# Patient Record
Sex: FEMALE | Race: WHITE | NOT HISPANIC OR LATINO | URBAN - METROPOLITAN AREA
[De-identification: names, ages, dates, MRNs, and addresses within clinical notes are randomized per-mention and may not be internally consistent; named-entity substitution may affect disease eponyms.]

---

## 2019-10-10 VITALS
HEIGHT: 60 IN | SYSTOLIC BLOOD PRESSURE: 109 MMHG | WEIGHT: 110.89 LBS | RESPIRATION RATE: 16 BRPM | DIASTOLIC BLOOD PRESSURE: 71 MMHG | OXYGEN SATURATION: 97 % | TEMPERATURE: 98 F | HEART RATE: 82 BPM

## 2019-10-11 ENCOUNTER — INPATIENT (INPATIENT)
Facility: HOSPITAL | Age: 69
LOS: 0 days | Discharge: HOME | DRG: 748 | End: 2019-10-12
Attending: UROLOGY | Admitting: UROLOGY
Payer: COMMERCIAL

## 2019-10-11 DIAGNOSIS — Z90.49 ACQUIRED ABSENCE OF OTHER SPECIFIED PARTS OF DIGESTIVE TRACT: Chronic | ICD-10-CM

## 2019-10-11 DIAGNOSIS — Z98.890 OTHER SPECIFIED POSTPROCEDURAL STATES: Chronic | ICD-10-CM

## 2019-10-11 RX ORDER — SODIUM CHLORIDE 9 MG/ML
1000 INJECTION, SOLUTION INTRAVENOUS
Refills: 0 | Status: DISCONTINUED | OUTPATIENT
Start: 2019-10-11 | End: 2019-10-12

## 2019-10-11 RX ORDER — CEPHALEXIN 500 MG
1 CAPSULE ORAL
Qty: 6 | Refills: 0
Start: 2019-10-11 | End: 2019-10-13

## 2019-10-11 RX ORDER — METOCLOPRAMIDE HCL 10 MG
10 TABLET ORAL ONCE
Refills: 0 | Status: COMPLETED | OUTPATIENT
Start: 2019-10-11 | End: 2019-10-11

## 2019-10-11 RX ORDER — ONDANSETRON 8 MG/1
4 TABLET, FILM COATED ORAL EVERY 6 HOURS
Refills: 0 | Status: DISCONTINUED | OUTPATIENT
Start: 2019-10-11 | End: 2019-10-12

## 2019-10-11 RX ORDER — OXYCODONE AND ACETAMINOPHEN 5; 325 MG/1; MG/1
1 TABLET ORAL EVERY 4 HOURS
Refills: 0 | Status: DISCONTINUED | OUTPATIENT
Start: 2019-10-11 | End: 2019-10-12

## 2019-10-11 RX ORDER — OXYCODONE AND ACETAMINOPHEN 5; 325 MG/1; MG/1
2 TABLET ORAL EVERY 6 HOURS
Refills: 0 | Status: DISCONTINUED | OUTPATIENT
Start: 2019-10-11 | End: 2019-10-12

## 2019-10-11 RX ORDER — HYDROMORPHONE HYDROCHLORIDE 2 MG/ML
0.5 INJECTION INTRAMUSCULAR; INTRAVENOUS; SUBCUTANEOUS
Refills: 0 | Status: DISCONTINUED | OUTPATIENT
Start: 2019-10-11 | End: 2019-10-12

## 2019-10-11 RX ADMIN — ONDANSETRON 4 MILLIGRAM(S): 8 TABLET, FILM COATED ORAL at 22:15

## 2019-10-11 RX ADMIN — HYDROMORPHONE HYDROCHLORIDE 0.5 MILLIGRAM(S): 2 INJECTION INTRAMUSCULAR; INTRAVENOUS; SUBCUTANEOUS at 18:50

## 2019-10-11 RX ADMIN — HYDROMORPHONE HYDROCHLORIDE 0.5 MILLIGRAM(S): 2 INJECTION INTRAMUSCULAR; INTRAVENOUS; SUBCUTANEOUS at 19:14

## 2019-10-11 RX ADMIN — Medication 10 MILLIGRAM(S): at 19:45

## 2019-10-11 RX ADMIN — HYDROMORPHONE HYDROCHLORIDE 0.5 MILLIGRAM(S): 2 INJECTION INTRAMUSCULAR; INTRAVENOUS; SUBCUTANEOUS at 19:20

## 2019-10-11 NOTE — BRIEF OPERATIVE NOTE - NSICDXBRIEFPROCEDURE_GEN_ALL_CORE_FT
PROCEDURES:  Creation, sling, mid-urethral, female 11-Oct-2019 18:33:15  Karri Chen  Cystoscopy, with cystocele repair 11-Oct-2019 18:32:54  Karri Chen

## 2019-10-11 NOTE — H&P ADULT - NSHPPHYSICALEXAM_GEN_ALL_CORE
Vital Signs Last 24 Hrs  T(C): 36.4 (12 Oct 2019 06:24), Max: 36.4 (12 Oct 2019 06:24)  T(F): 97.6 (12 Oct 2019 06:24), Max: 97.6 (12 Oct 2019 06:24)  HR: 76 (12 Oct 2019 06:24) (72 - 86)  BP: 110/62 (12 Oct 2019 06:24) (105/58 - 163/69)  BP(mean): 84 (12 Oct 2019 04:00) (80 - 105)  RR: 18 (12 Oct 2019 06:24) (12 - 20)  SpO2: 96% (12 Oct 2019 06:24) (95% - 99%)    Gen: NAD  Abd: soft, no guarding or rigidity  : voiding

## 2019-10-12 ENCOUNTER — TRANSCRIPTION ENCOUNTER (OUTPATIENT)
Age: 69
End: 2019-10-12

## 2019-10-12 VITALS
TEMPERATURE: 98 F | DIASTOLIC BLOOD PRESSURE: 55 MMHG | SYSTOLIC BLOOD PRESSURE: 113 MMHG | RESPIRATION RATE: 14 BRPM | OXYGEN SATURATION: 97 % | HEART RATE: 80 BPM

## 2019-10-12 DIAGNOSIS — N81.4 UTEROVAGINAL PROLAPSE, UNSPECIFIED: ICD-10-CM

## 2019-10-12 RX ORDER — OXYCODONE AND ACETAMINOPHEN 5; 325 MG/1; MG/1
2 TABLET ORAL EVERY 6 HOURS
Refills: 0 | Status: DISCONTINUED | OUTPATIENT
Start: 2019-10-12 | End: 2019-10-12

## 2019-10-12 RX ORDER — ONDANSETRON 8 MG/1
4 TABLET, FILM COATED ORAL EVERY 6 HOURS
Refills: 0 | Status: DISCONTINUED | OUTPATIENT
Start: 2019-10-12 | End: 2019-10-12

## 2019-10-12 RX ORDER — SODIUM CHLORIDE 9 MG/ML
1000 INJECTION INTRAMUSCULAR; INTRAVENOUS; SUBCUTANEOUS
Refills: 0 | Status: DISCONTINUED | OUTPATIENT
Start: 2019-10-12 | End: 2019-10-12

## 2019-10-12 RX ORDER — OXYCODONE AND ACETAMINOPHEN 5; 325 MG/1; MG/1
1 TABLET ORAL EVERY 4 HOURS
Refills: 0 | Status: DISCONTINUED | OUTPATIENT
Start: 2019-10-12 | End: 2019-10-12

## 2019-10-12 RX ORDER — DILTIAZEM HCL 120 MG
120 CAPSULE, EXT RELEASE 24 HR ORAL DAILY
Refills: 0 | Status: DISCONTINUED | OUTPATIENT
Start: 2019-10-12 | End: 2019-10-12

## 2019-10-12 RX ORDER — KETOROLAC TROMETHAMINE 30 MG/ML
30 SYRINGE (ML) INJECTION ONCE
Refills: 0 | Status: DISCONTINUED | OUTPATIENT
Start: 2019-10-12 | End: 2019-10-12

## 2019-10-12 RX ORDER — LEVOTHYROXINE SODIUM 125 MCG
1 TABLET ORAL
Qty: 0 | Refills: 0 | DISCHARGE

## 2019-10-12 RX ORDER — ACETAMINOPHEN 500 MG
650 TABLET ORAL EVERY 6 HOURS
Refills: 0 | Status: DISCONTINUED | OUTPATIENT
Start: 2019-10-12 | End: 2019-10-12

## 2019-10-12 RX ORDER — CEPHALEXIN 500 MG
500 CAPSULE ORAL EVERY 12 HOURS
Refills: 0 | Status: DISCONTINUED | OUTPATIENT
Start: 2019-10-12 | End: 2019-10-12

## 2019-10-12 RX ORDER — LIOTHYRONINE SODIUM 25 UG/1
1 TABLET ORAL
Qty: 0 | Refills: 0 | DISCHARGE

## 2019-10-12 RX ORDER — DILTIAZEM HCL 120 MG
1 CAPSULE, EXT RELEASE 24 HR ORAL
Qty: 0 | Refills: 0 | DISCHARGE

## 2019-10-12 RX ORDER — METOCLOPRAMIDE HCL 10 MG
10 TABLET ORAL EVERY 6 HOURS
Refills: 0 | Status: DISCONTINUED | OUTPATIENT
Start: 2019-10-12 | End: 2019-10-12

## 2019-10-12 RX ADMIN — Medication 500 MILLIGRAM(S): at 11:49

## 2019-10-12 RX ADMIN — Medication 30 MILLIGRAM(S): at 09:42

## 2019-10-12 RX ADMIN — Medication 120 MILLIGRAM(S): at 11:49

## 2019-10-12 RX ADMIN — Medication 30 MILLIGRAM(S): at 10:47

## 2019-10-12 NOTE — DISCHARGE NOTE PROVIDER - HOSPITAL COURSE
Pt. admitted overnight in the PACUs/p Anterior Colporhaphy, Cystocele repair, for urinary retention and post op drowsiness. Overnight there were no events, but failed TOV with progressively rising PVR from 450 to 580ml. grande was placed and will be discharged with grande to legbag.

## 2019-10-12 NOTE — DISCHARGE NOTE PROVIDER - CARE PROVIDER_API CALL
Paulette Alves)  Urology  [address] [place] [address]  Phone: [phone]  Fax: [phone]  Follow Up Time:

## 2019-10-12 NOTE — PROGRESS NOTE ADULT - SUBJECTIVE AND OBJECTIVE BOX
Patient seen in PACU 1st floor at 9:30pm.  Patient and  refusing admission.  RN initially concerned at patient's drowsiness s/p dilaudid.  Patient was seen at bedside and was AAOx3, ambulating and no longer drowsy.   Informed them that they can stay to morning and be discharged early, but at present they decline.    Patient was then sent to 10th floor recovery.  Talked to  at midnight.  They still refuse admission and would like some more time to recover.  They want to wait until 1am to make a final decision.

## 2019-10-12 NOTE — PROGRESS NOTE ADULT - SUBJECTIVE AND OBJECTIVE BOX
AM Note    No acute events overnight.  No N/V/D/F.  Voiding overnight.  Pain controlled.      Vital Signs Last 24 Hrs  T(C): 36.2 (10-11-19 @ 22:25), Max: 36.2 (10-11-19 @ 22:25)  T(F): 97.1 (10-11-19 @ 22:25), Max: 97.1 (10-11-19 @ 22:25)  HR: 86 (10-12-19 @ 04:00) (72 - 86)  BP: 105/58 (10-12-19 @ 04:00) (105/58 - 163/69)  BP(mean): 84 (10-12-19 @ 04:00) (80 - 105)  RR: 22 (10-12-19 @ 04:00) (12 - 22)  SpO2: 96% (10-12-19 @ 04:00) (95% - 99%)                 I&O's Summary    11 Oct 2019 07:01  -  12 Oct 2019 05:48  --------------------------------------------------------  IN: 675 mL / OUT: 250 mL / NET: 425 mL          PHYSICAL EXAM:    GEN: NAD  ABD: soft, NT/ND  : voiding, vaginal packing in place

## 2019-10-18 DIAGNOSIS — F41.9 ANXIETY DISORDER, UNSPECIFIED: ICD-10-CM

## 2019-10-18 DIAGNOSIS — I48.0 PAROXYSMAL ATRIAL FIBRILLATION: ICD-10-CM

## 2019-10-18 DIAGNOSIS — R33.9 RETENTION OF URINE, UNSPECIFIED: ICD-10-CM

## 2019-10-18 DIAGNOSIS — E07.9 DISORDER OF THYROID, UNSPECIFIED: ICD-10-CM

## 2019-10-18 DIAGNOSIS — N81.89 OTHER FEMALE GENITAL PROLAPSE: ICD-10-CM

## 2019-10-18 DIAGNOSIS — N81.10 CYSTOCELE, UNSPECIFIED: ICD-10-CM

## 2019-10-28 PROCEDURE — 57240 ANTERIOR COLPORRHAPHY: CPT

## 2019-10-28 PROCEDURE — 57267 INSERT MESH/PELVIC FLR ADDON: CPT

## 2019-10-28 PROCEDURE — C1771: CPT

## 2019-10-28 PROCEDURE — 51702 INSERT TEMP BLADDER CATH: CPT

## 2019-10-28 PROCEDURE — C1776: CPT

## 2019-10-28 PROCEDURE — 57288 REPAIR BLADDER DEFECT: CPT

## 2019-10-28 PROCEDURE — C1781: CPT

## 2019-10-28 PROCEDURE — 52000 CYSTOURETHROSCOPY: CPT

## 2020-07-20 PROBLEM — N81.4 UTEROVAGINAL PROLAPSE, UNSPECIFIED: Chronic | Status: ACTIVE | Noted: 2019-10-10

## 2020-07-20 PROBLEM — I48.91 UNSPECIFIED ATRIAL FIBRILLATION: Chronic | Status: ACTIVE | Noted: 2019-10-10

## 2020-08-12 ENCOUNTER — APPOINTMENT (OUTPATIENT)
Dept: HEART AND VASCULAR | Facility: CLINIC | Age: 70
End: 2020-08-12
Payer: MEDICARE

## 2020-08-12 ENCOUNTER — NON-APPOINTMENT (OUTPATIENT)
Age: 70
End: 2020-08-12

## 2020-08-12 VITALS — DIASTOLIC BLOOD PRESSURE: 76 MMHG | SYSTOLIC BLOOD PRESSURE: 124 MMHG | HEART RATE: 79 BPM

## 2020-08-12 VITALS — BODY MASS INDEX: 21.4 KG/M2 | HEIGHT: 60 IN | WEIGHT: 109 LBS

## 2020-08-12 DIAGNOSIS — I48.0 PAROXYSMAL ATRIAL FIBRILLATION: ICD-10-CM

## 2020-08-12 PROCEDURE — 99204 OFFICE O/P NEW MOD 45 MIN: CPT | Mod: 25

## 2020-08-12 PROCEDURE — 93306 TTE W/DOPPLER COMPLETE: CPT

## 2020-08-12 PROCEDURE — 93000 ELECTROCARDIOGRAM COMPLETE: CPT

## 2020-08-12 NOTE — HISTORY OF PRESENT ILLNESS
[FreeTextEntry1] : Patient is 69 y/o woman with hx of PAF present before scheduled pelvic floor surgery with hysterectomy b/o incontinence. She is otherwise in good health, denies CP, SOB, SOTELO, palpitations or leg swelling. Her exercise is limited b/o worsening incontinence.\par

## 2020-08-12 NOTE — PHYSICAL EXAM
[General Appearance - Well Developed] : well developed [General Appearance - Well Nourished] : well nourished [Normal Conjunctiva] : the conjunctiva exhibited no abnormalities [Normal Jugular Venous V Waves Present] : normal jugular venous V waves present [Normal Oropharynx] : normal oropharynx [Heart Rate And Rhythm] : heart rate and rhythm were normal [Heart Sounds] : normal S1 and S2 [Arterial Pulses Normal] : the arterial pulses were normal [Murmurs] : no murmurs present [Edema] : no peripheral edema present [Bowel Sounds] : normal bowel sounds [Abdomen Soft] : soft [Abdomen Tenderness] : non-tender [] : no hepato-splenomegaly [Abnormal Walk] : normal gait [Skin Color & Pigmentation] : normal skin color and pigmentation [Nail Clubbing] : no clubbing of the fingernails [Oriented To Time, Place, And Person] : oriented to person, place, and time

## 2020-08-12 NOTE — REVIEW OF SYSTEMS
[Incontinence] : incontinence [Fever] : no fever [Earache] : no earache [Blurry Vision] : no blurred vision [Shortness Of Breath] : no shortness of breath [Dyspnea on exertion] : not dyspnea during exertion [Chest Pain] : no chest pain [Palpitations] : no palpitations [Lower Ext Edema] : no extremity edema [Heartburn] : no heartburn [Abdominal Pain] : no abdominal pain [Cough] : no cough [Dysuria] : no dysuria [Joint Pain] : no joint pain [Skin: A Rash] : no rash: [Confusion] : no confusion was observed [Dizziness] : no dizziness [Easy Bleeding] : no tendency for easy bleeding [Excessive Thirst] : no polydipsia

## 2020-08-12 NOTE — DISCUSSION/SUMMARY
[FreeTextEntry1] : EKG: Normal Sinus  Rhythm 79/min, SVPC \par \par \par 1. PAF - in NSR. I d/w pt r/b/a of A/C including, but not ltd to CVA, bleed, death. She will think about it and make final decision after surgery. Continue diltiazem for now.\par 2. HLD - diet only - cont to monitor with primary cardiologist.\par 3. Hypothyroidism - f/u with PCP\par 4. RTC prn (pt lives in [facility]).\par \par Patient is at low risk for planned pelvic floor surgery with histerectomy from cardiac standpoint. She should continue diltiazem perioperatively.\par \par \par

## 2020-08-13 ENCOUNTER — TRANSCRIPTION ENCOUNTER (OUTPATIENT)
Age: 70
End: 2020-08-13

## 2020-08-13 VITALS
HEART RATE: 77 BPM | DIASTOLIC BLOOD PRESSURE: 81 MMHG | RESPIRATION RATE: 14 BRPM | SYSTOLIC BLOOD PRESSURE: 136 MMHG | WEIGHT: 108.03 LBS | HEIGHT: 60 IN | OXYGEN SATURATION: 97 % | TEMPERATURE: 98 F

## 2020-08-13 NOTE — ASU PREOP CHECKLIST - SELECT TESTS ORDERED
PT/PTT/CXR/BMP/covid 19}neg/CBC/EKG covid 19}neg/EKG/CXR CMP/CXR/covid 19}neg 8/12/INR/Type and Screen/EKG/CBC/PT/PTT

## 2020-08-13 NOTE — ASU PATIENT PROFILE, ADULT - HEALTHCARE INFORMATION NEEDED, PROFILE
/// Patient and  Cyril Alvares staying in [facility],[address] [place] [address]  Rm [phone]

## 2020-08-14 ENCOUNTER — INPATIENT (INPATIENT)
Facility: HOSPITAL | Age: 70
LOS: 0 days | Discharge: HOME | DRG: 742 | End: 2020-08-15
Attending: UROLOGY | Admitting: UROLOGY
Payer: MEDICARE

## 2020-08-14 ENCOUNTER — RESULT REVIEW (OUTPATIENT)
Age: 70
End: 2020-08-14

## 2020-08-14 DIAGNOSIS — Z98.890 OTHER SPECIFIED POSTPROCEDURAL STATES: Chronic | ICD-10-CM

## 2020-08-14 DIAGNOSIS — Z90.49 ACQUIRED ABSENCE OF OTHER SPECIFIED PARTS OF DIGESTIVE TRACT: Chronic | ICD-10-CM

## 2020-08-14 DIAGNOSIS — N81.4 UTEROVAGINAL PROLAPSE, UNSPECIFIED: ICD-10-CM

## 2020-08-14 LAB
CA-I BLDA-SCNC: 1 MMOL/L — LOW (ref 1.12–1.3)
COHGB MFR BLDA: 0.3 % — SIGNIFICANT CHANGE UP
GLUCOSE BLDC GLUCOMTR-MCNC: 106 MG/DL — HIGH (ref 70–99)
GLUCOSE BLDC GLUCOMTR-MCNC: 125 MG/DL — HIGH (ref 70–99)
HCO3 BLDA-SCNC: 25 MMOL/L — SIGNIFICANT CHANGE UP (ref 21–28)
HGB BLDA-MCNC: 10.8 G/DL — LOW (ref 11.5–15.5)
METHGB MFR BLDA: 0.2 % — SIGNIFICANT CHANGE UP
O2 CT VFR BLDA CALC: 15.9 ML/DL — SIGNIFICANT CHANGE UP (ref 15–23)
OXYHGB MFR BLDA: 99 % — SIGNIFICANT CHANGE UP (ref 94–100)
PCO2 BLDA: 41 MMHG — SIGNIFICANT CHANGE UP (ref 32–45)
PH BLDA: 7.4 — SIGNIFICANT CHANGE UP (ref 7.35–7.45)
PO2 BLDA: 324 MMHG — HIGH (ref 83–108)
POTASSIUM BLDA-SCNC: 3.9 MMOL/L — SIGNIFICANT CHANGE UP (ref 3.5–4.9)
SAO2 % BLDA: 100 % — SIGNIFICANT CHANGE UP (ref 95–100)
SODIUM BLDA-SCNC: 136 MMOL/L — LOW (ref 138–146)

## 2020-08-14 PROCEDURE — 88305 TISSUE EXAM BY PATHOLOGIST: CPT | Mod: 26

## 2020-08-14 RX ORDER — OXYCODONE AND ACETAMINOPHEN 5; 325 MG/1; MG/1
1 TABLET ORAL EVERY 6 HOURS
Refills: 0 | Status: DISCONTINUED | OUTPATIENT
Start: 2020-08-14 | End: 2020-08-14

## 2020-08-14 RX ORDER — SODIUM CHLORIDE 9 MG/ML
1000 INJECTION INTRAMUSCULAR; INTRAVENOUS; SUBCUTANEOUS
Refills: 0 | Status: DISCONTINUED | OUTPATIENT
Start: 2020-08-14 | End: 2020-08-15

## 2020-08-14 RX ORDER — LIOTHYRONINE SODIUM 25 UG/1
12.5 TABLET ORAL DAILY
Refills: 0 | Status: DISCONTINUED | OUTPATIENT
Start: 2020-08-14 | End: 2020-08-15

## 2020-08-14 RX ORDER — LEVOTHYROXINE SODIUM 125 MCG
1 TABLET ORAL
Qty: 0 | Refills: 0 | DISCHARGE

## 2020-08-14 RX ORDER — HYDROMORPHONE HYDROCHLORIDE 2 MG/ML
0.5 INJECTION INTRAMUSCULAR; INTRAVENOUS; SUBCUTANEOUS ONCE
Refills: 0 | Status: DISCONTINUED | OUTPATIENT
Start: 2020-08-14 | End: 2020-08-14

## 2020-08-14 RX ORDER — HYDROMORPHONE HYDROCHLORIDE 2 MG/ML
0.5 INJECTION INTRAMUSCULAR; INTRAVENOUS; SUBCUTANEOUS
Refills: 0 | Status: DISCONTINUED | OUTPATIENT
Start: 2020-08-14 | End: 2020-08-15

## 2020-08-14 RX ORDER — ALBUMIN HUMAN 25 %
500 VIAL (ML) INTRAVENOUS ONCE
Refills: 0 | Status: DISCONTINUED | OUTPATIENT
Start: 2020-08-14 | End: 2020-08-15

## 2020-08-14 RX ORDER — LEVOTHYROXINE SODIUM 125 MCG
100 TABLET ORAL DAILY
Refills: 0 | Status: DISCONTINUED | OUTPATIENT
Start: 2020-08-14 | End: 2020-08-14

## 2020-08-14 RX ORDER — OXYCODONE HYDROCHLORIDE 5 MG/1
10 TABLET ORAL EVERY 4 HOURS
Refills: 0 | Status: DISCONTINUED | OUTPATIENT
Start: 2020-08-14 | End: 2020-08-15

## 2020-08-14 RX ORDER — LEVOTHYROXINE SODIUM 125 MCG
100 TABLET ORAL DAILY
Refills: 0 | Status: DISCONTINUED | OUTPATIENT
Start: 2020-08-14 | End: 2020-08-15

## 2020-08-14 RX ORDER — ONDANSETRON 8 MG/1
4 TABLET, FILM COATED ORAL EVERY 6 HOURS
Refills: 0 | Status: DISCONTINUED | OUTPATIENT
Start: 2020-08-14 | End: 2020-08-15

## 2020-08-14 RX ORDER — ACETAMINOPHEN 500 MG
650 TABLET ORAL EVERY 6 HOURS
Refills: 0 | Status: DISCONTINUED | OUTPATIENT
Start: 2020-08-14 | End: 2020-08-15

## 2020-08-14 RX ORDER — VALACYCLOVIR 500 MG/1
1 TABLET, FILM COATED ORAL
Qty: 0 | Refills: 0 | DISCHARGE

## 2020-08-14 RX ORDER — METHENAMINE MANDELATE 1 G
1 TABLET ORAL
Qty: 0 | Refills: 0 | DISCHARGE

## 2020-08-14 RX ORDER — OXYCODONE HYDROCHLORIDE 5 MG/1
5 TABLET ORAL EVERY 4 HOURS
Refills: 0 | Status: DISCONTINUED | OUTPATIENT
Start: 2020-08-14 | End: 2020-08-15

## 2020-08-14 RX ORDER — DILTIAZEM HCL 120 MG
1 CAPSULE, EXT RELEASE 24 HR ORAL
Qty: 0 | Refills: 0 | DISCHARGE

## 2020-08-14 RX ORDER — DILTIAZEM HCL 120 MG
120 CAPSULE, EXT RELEASE 24 HR ORAL DAILY
Refills: 0 | Status: DISCONTINUED | OUTPATIENT
Start: 2020-08-14 | End: 2020-08-14

## 2020-08-14 RX ORDER — CEFAZOLIN SODIUM 1 G
2000 VIAL (EA) INJECTION EVERY 8 HOURS
Refills: 0 | Status: COMPLETED | OUTPATIENT
Start: 2020-08-14 | End: 2020-08-15

## 2020-08-14 RX ORDER — LIOTHYRONINE SODIUM 25 UG/1
25 TABLET ORAL DAILY
Refills: 0 | Status: DISCONTINUED | OUTPATIENT
Start: 2020-08-14 | End: 2020-08-14

## 2020-08-14 RX ORDER — DILTIAZEM HCL 120 MG
120 CAPSULE, EXT RELEASE 24 HR ORAL DAILY
Refills: 0 | Status: DISCONTINUED | OUTPATIENT
Start: 2020-08-14 | End: 2020-08-15

## 2020-08-14 RX ORDER — HYDROMORPHONE HYDROCHLORIDE 2 MG/ML
0.5 INJECTION INTRAMUSCULAR; INTRAVENOUS; SUBCUTANEOUS
Refills: 0 | Status: DISCONTINUED | OUTPATIENT
Start: 2020-08-14 | End: 2020-08-14

## 2020-08-14 RX ADMIN — HYDROMORPHONE HYDROCHLORIDE 0.5 MILLIGRAM(S): 2 INJECTION INTRAMUSCULAR; INTRAVENOUS; SUBCUTANEOUS at 19:38

## 2020-08-14 RX ADMIN — HYDROMORPHONE HYDROCHLORIDE 0.5 MILLIGRAM(S): 2 INJECTION INTRAMUSCULAR; INTRAVENOUS; SUBCUTANEOUS at 19:48

## 2020-08-14 RX ADMIN — Medication 650 MILLIGRAM(S): at 22:51

## 2020-08-14 RX ADMIN — ONDANSETRON 4 MILLIGRAM(S): 8 TABLET, FILM COATED ORAL at 20:19

## 2020-08-14 RX ADMIN — Medication 100 MILLIGRAM(S): at 22:51

## 2020-08-14 RX ADMIN — HYDROMORPHONE HYDROCHLORIDE 0.5 MILLIGRAM(S): 2 INJECTION INTRAMUSCULAR; INTRAVENOUS; SUBCUTANEOUS at 18:40

## 2020-08-14 RX ADMIN — HYDROMORPHONE HYDROCHLORIDE 0.5 MILLIGRAM(S): 2 INJECTION INTRAMUSCULAR; INTRAVENOUS; SUBCUTANEOUS at 18:23

## 2020-08-14 NOTE — PROGRESS NOTE ADULT - SUBJECTIVE AND OBJECTIVE BOX
Post Op    No N/V/D/F.  Pain moderately controlled on dilaudid.    ICU Vital Signs Last 24 Hrs  T(C): 36.4 (14 Aug   2020 22:18), Max: 36.4 (14 Aug 2020 22:18)  T(F): 97.5 (14 Aug 2020 22:18), Max: 97.5 (14 Aug 2020 22:18)  HR: 81 (14 Aug 2020 22:18) (72 - 84)  BP: 103/61 (14 Aug 2020 22:18) (103/61 - 131/63)  BP(mean): 90 (14 Aug 2020 17:45) (83 - 90)  ABP: 118/56 (14 Aug 2020 20:00) (118/56 - 136/54)  ABP(mean): --  RR: 16 (14 Aug 2020 22:18) (15 - 18)  SpO2: 99% (14 Aug 2020 22:18) (95% - 100%)      Gen: NAD, AAOx3  Abd: soft, NT/ND  : grande in place, vaginal packing in place

## 2020-08-15 ENCOUNTER — TRANSCRIPTION ENCOUNTER (OUTPATIENT)
Age: 70
End: 2020-08-15

## 2020-08-15 VITALS
RESPIRATION RATE: 16 BRPM | DIASTOLIC BLOOD PRESSURE: 58 MMHG | TEMPERATURE: 98 F | SYSTOLIC BLOOD PRESSURE: 102 MMHG | HEART RATE: 79 BPM | OXYGEN SATURATION: 96 %

## 2020-08-15 RX ORDER — OXYCODONE HYDROCHLORIDE 5 MG/1
1 TABLET ORAL
Qty: 6 | Refills: 0
Start: 2020-08-15

## 2020-08-15 RX ORDER — DILTIAZEM HCL 120 MG
120 CAPSULE, EXT RELEASE 24 HR ORAL DAILY
Refills: 0 | Status: DISCONTINUED | OUTPATIENT
Start: 2020-08-15 | End: 2020-08-15

## 2020-08-15 RX ORDER — LIOTHYRONINE SODIUM 25 UG/1
1 TABLET ORAL
Qty: 0 | Refills: 0 | DISCHARGE

## 2020-08-15 RX ADMIN — Medication 650 MILLIGRAM(S): at 05:19

## 2020-08-15 RX ADMIN — SODIUM CHLORIDE 120 MILLILITER(S): 9 INJECTION INTRAMUSCULAR; INTRAVENOUS; SUBCUTANEOUS at 06:29

## 2020-08-15 RX ADMIN — Medication 650 MILLIGRAM(S): at 12:30

## 2020-08-15 RX ADMIN — Medication 650 MILLIGRAM(S): at 00:02

## 2020-08-15 RX ADMIN — OXYCODONE HYDROCHLORIDE 5 MILLIGRAM(S): 5 TABLET ORAL at 14:15

## 2020-08-15 RX ADMIN — ONDANSETRON 4 MILLIGRAM(S): 8 TABLET, FILM COATED ORAL at 08:53

## 2020-08-15 RX ADMIN — OXYCODONE HYDROCHLORIDE 5 MILLIGRAM(S): 5 TABLET ORAL at 13:15

## 2020-08-15 RX ADMIN — Medication 120 MILLIGRAM(S): at 08:58

## 2020-08-15 RX ADMIN — Medication 650 MILLIGRAM(S): at 04:06

## 2020-08-15 RX ADMIN — Medication 650 MILLIGRAM(S): at 11:30

## 2020-08-15 RX ADMIN — Medication 100 MILLIGRAM(S): at 06:27

## 2020-08-15 NOTE — DISCHARGE NOTE NURSING/CASE MANAGEMENT/SOCIAL WORK - PATIENT PORTAL LINK FT
You can access the FollowMyHealth Patient Portal offered by [facility] by registering at the following website: [email or web address]. By joining [facility]’s FollowMyHealth portal, you will also be able to view your health information using other applications (apps) compatible with our system.

## 2020-08-15 NOTE — DISCHARGE NOTE PROVIDER - NSDCCPTREATMENT_GEN_ALL_CORE_FT
PRINCIPAL PROCEDURE  Procedure: Radical vaginal hysterectomy  Findings and Treatment:       SECONDARY PROCEDURE  Procedure: Repair of pelvic floor using mesh  Findings and Treatment:

## 2020-08-15 NOTE — PROGRESS NOTE ADULT - SUBJECTIVE AND OBJECTIVE BOX
JANET BUCHANAN  [id number]  08-15-20 @ 05:12      UROLOGY SERVICE: DAILY PROGRESS NOTE    No acute events overnight.  No N/V/D/F.  Pain moderately controlled.      LABS:             I/O:  I&O's Summary    14 Aug 2020 07:01  -  15 Aug 2020 05:12  --------------------------------------------------------  IN: 1320 mL / OUT: 885 mL / NET: 435 mL        VITALS:  Vital Signs Last 24 Hrs  T(C): 36.7 (08-15-20 @ 00:53), Max: 36.7 (08-15-20 @ 00:53)  T(F): 98 (08-15-20 @ 00:53), Max: 98 (08-15-20 @ 00:53)  HR: 83 (08-15-20 @ 00:53) (72 - 84)  BP: 110/56 (08-15-20 @ 00:53) (103/61 - 131/63)  BP(mean): 90 (08-14-20 @ 17:45) (83 - 90)  RR: 17 (08-15-20 @ 00:53) (15 - 18)  SpO2: 99% (08-15-20 @ 00:53) (95% - 100%)      PHYSICAL EXAM:    GEN: NAD  ABD: soft, NT/ND, no guarding or rigidity  : grande in place draining clear urine, vaginal packing in place  EXT: b/l LE with SCDS on

## 2020-08-15 NOTE — DISCHARGE NOTE PROVIDER - CARE PROVIDER_API CALL
Paulette Alves  UROLOGY  [address] [place] [id number]  Phone: [phone]  Fax: [phone]  Follow Up Time:

## 2020-08-15 NOTE — DISCHARGE NOTE PROVIDER - HOSPITAL COURSE
70F with prolapse s/p vaginal hysterectomy, pelvic floor repair with mesh on 8/14/2020. She tolerated the procedure well. Her vaginal packing was removed on 8/15 am and her fiket as well. She passed TOV. She is stable to go home. She is ambulating. VSS and hemodynamically stable.

## 2020-08-15 NOTE — DISCHARGE NOTE PROVIDER - NSDCMRMEDTOKEN_GEN_ALL_CORE_FT
Ativan 0.5 mg oral tablet: 1 tab(s) orally 3 times a day, As Needed - for anxiety  Cardizem 120 mg oral tablet: 1 tab(s) orally once a day  Cytomel 25 mcg oral tablet: 1 tab(s) orally once a day  nitrofurantoin macrocrystals 100 mg oral capsule: 1 cap(s) orally once a day  Synthroid 100 mcg (0.1 mg) oral tablet: 1 tab(s) orally once a day  valACYclovir 1 g oral tablet: 1 tab(s) orally once a day Ativan 0.5 mg oral tablet: 1 tab(s) orally 3 times a day, As Needed - for anxiety  Cardizem 120 mg oral tablet: 1 tab(s) orally once a day  nitrofurantoin macrocrystals 100 mg oral capsule: 1 cap(s) orally once a day  Synthroid 100 mcg (0.1 mg) oral tablet: 1 tab(s) orally once a day  valACYclovir 1 g oral tablet: 1 tab(s) orally once a day Ativan 0.5 mg oral tablet: 1 tab(s) orally 3 times a day, As Needed - for anxiety  Cardizem 120 mg oral tablet: 1 tab(s) orally once a day  nitrofurantoin macrocrystals 100 mg oral capsule: 1 cap(s) orally once a day  oxyCODONE 5 mg oral capsule: 1 cap(s) orally every 6 hours, As Needed MDD:2  Synthroid 100 mcg (0.1 mg) oral tablet: 1 tab(s) orally once a day  valACYclovir 1 g oral tablet: 1 tab(s) orally once a day

## 2020-08-15 NOTE — DISCHARGE NOTE PROVIDER - NSDCFUADDINST_GEN_ALL_CORE_FT
yes If any fever > 101.0, chills, severe nausea or pain, please call the office/go to the ER.    You may have some dysuria, this is normal If any fever > 101.0, chills, severe nausea or pain, please call the office/go to the ER.    You may have some dysuria and hematuria, this is normal If any fever > 101.0, chills, severe nausea or pain, please call the office/go to the ER. No strenuous activity.     You may have some dysuria and hematuria, this is normal    You can call Dr. Alves when you reach the hotel to update her If any fever > 101.0, chills, severe nausea or pain, please call the office/go to the ER. No strenuous activity. Take pain medication only for severe pain, try to take Tylenol first     You may have some dysuria and hematuria, this is normal    You can call Dr. Alves when you reach the hotel to update her If any fever > 101.0, chills, severe nausea or pain, please call the office/go to the ER. No strenuous activity. Take pain medication only for severe pain, try to take Tylenol first     Paitent was advised if unable to urinate, they can straight cath or go to the ER and follow up with . Supplies provided     You may have some dysuria and hematuria, this is normal    You can call Dr. Alves when you reach the hotel to update her

## 2020-08-15 NOTE — DISCHARGE NOTE PROVIDER - NSDCCPCAREPLAN_GEN_ALL_CORE_FT
PRINCIPAL DISCHARGE DIAGNOSIS  Diagnosis: Uterine prolapse  Assessment and Plan of Treatment: Uterine prolapse

## 2020-08-17 PROCEDURE — 82330 ASSAY OF CALCIUM: CPT

## 2020-08-17 PROCEDURE — P9045: CPT

## 2020-08-17 PROCEDURE — 88305 TISSUE EXAM BY PATHOLOGIST: CPT

## 2020-08-17 PROCEDURE — 86850 RBC ANTIBODY SCREEN: CPT

## 2020-08-17 PROCEDURE — 82962 GLUCOSE BLOOD TEST: CPT

## 2020-08-17 PROCEDURE — C1781: CPT

## 2020-08-17 PROCEDURE — 85018 HEMOGLOBIN: CPT

## 2020-08-17 PROCEDURE — 84132 ASSAY OF SERUM POTASSIUM: CPT

## 2020-08-17 PROCEDURE — 86901 BLOOD TYPING SEROLOGIC RH(D): CPT

## 2020-08-17 PROCEDURE — 84295 ASSAY OF SERUM SODIUM: CPT

## 2020-08-17 PROCEDURE — 86923 COMPATIBILITY TEST ELECTRIC: CPT

## 2020-08-20 LAB — SURGICAL PATHOLOGY STUDY: SIGNIFICANT CHANGE UP

## 2020-08-21 DIAGNOSIS — N81.4 UTEROVAGINAL PROLAPSE, UNSPECIFIED: ICD-10-CM

## 2020-08-21 DIAGNOSIS — Q21.1 ATRIAL SEPTAL DEFECT: ICD-10-CM

## 2020-08-21 DIAGNOSIS — I48.91 UNSPECIFIED ATRIAL FIBRILLATION: ICD-10-CM

## 2020-08-21 DIAGNOSIS — Z11.59 ENCOUNTER FOR SCREENING FOR OTHER VIRAL DISEASES: ICD-10-CM

## 2020-08-21 DIAGNOSIS — N81.3 COMPLETE UTEROVAGINAL PROLAPSE: ICD-10-CM

## 2020-08-21 DIAGNOSIS — E07.9 DISORDER OF THYROID, UNSPECIFIED: ICD-10-CM

## 2020-08-21 DIAGNOSIS — Z87.440 PERSONAL HISTORY OF URINARY (TRACT) INFECTIONS: ICD-10-CM

## 2022-05-09 NOTE — PATIENT PROFILE ADULT - NSPROPOAURINARYCATHETER_GEN_A_NUR
Rex Negrete  is being evaluated via a billable video visit.      How would you like to obtain your AVS? [employer]  For the video visit, send the invitation by: Text to cell phone: [phone]  Will anyone else be joining your video visit? No         no

## 2024-03-14 NOTE — DISCHARGE NOTE PROVIDER - NSDCDCMDCOMP_GEN_ALL_CORE
[Family Member] : family member [Father] : father This document is complete and the patient is ready for discharge.

## 2024-06-25 NOTE — DISCHARGE NOTE PROVIDER - NSDCACTIVITY_GEN_ALL_CORE
Bathing allowed/Stairs allowed/Showering allowed/No heavy lifting/straining/Walking - Outdoors allowed/Walking - Indoors allowed/Do not drive or operate machinery
Patient unable to complete